# Patient Record
Sex: MALE | Race: WHITE | NOT HISPANIC OR LATINO | Employment: FULL TIME | URBAN - METROPOLITAN AREA
[De-identification: names, ages, dates, MRNs, and addresses within clinical notes are randomized per-mention and may not be internally consistent; named-entity substitution may affect disease eponyms.]

---

## 2020-01-02 ENCOUNTER — APPOINTMENT (OUTPATIENT)
Dept: GENERAL RADIOLOGY | Facility: HOSPITAL | Age: 20
End: 2020-01-02

## 2020-01-02 ENCOUNTER — HOSPITAL ENCOUNTER (EMERGENCY)
Facility: HOSPITAL | Age: 20
Discharge: HOME | End: 2020-01-02
Attending: EMERGENCY MEDICINE | Admitting: EMERGENCY MEDICINE

## 2020-01-02 VITALS
TEMPERATURE: 98.6 F | SYSTOLIC BLOOD PRESSURE: 110 MMHG | DIASTOLIC BLOOD PRESSURE: 83 MMHG | WEIGHT: 185 LBS | HEART RATE: 85 BPM | BODY MASS INDEX: 24.52 KG/M2 | OXYGEN SATURATION: 100 % | RESPIRATION RATE: 18 BRPM | HEIGHT: 73 IN

## 2020-01-02 DIAGNOSIS — S60.417A ABRASION OF LEFT LITTLE FINGER, INITIAL ENCOUNTER: ICD-10-CM

## 2020-01-02 DIAGNOSIS — S60.052A CONTUSION OF LEFT LITTLE FINGER WITHOUT DAMAGE TO NAIL, INITIAL ENCOUNTER: Primary | ICD-10-CM

## 2020-01-02 PROCEDURE — 73140 X-RAY EXAM OF FINGER(S): CPT

## 2020-01-02 PROCEDURE — 99282 EMERGENCY DEPT VISIT SF MDM: CPT

## 2020-01-02 PROCEDURE — 99283 EMERGENCY DEPT VISIT LOW MDM: CPT

## 2020-01-03 NOTE — ED NOTES
Reports trip and fall and landed on L hand. L fifth finger is swollen and painful with small abrassion to first knuckle.      Daina Packer, RN  01/02/20 2103

## 2020-01-03 NOTE — ED PROVIDER NOTES
Pt presents to the ED c/o  left pinky pain that occurred after he tripped and fell and landed on the ground earlier today.  Denies any numbness or weakness to the finger.  Has an abrasion to the finger.     On exam,   General: Awake, alert, no acute distress  HEENT: EOMI  Pulm: Symmetric chest rise, nonlabored breathing  CV: Regular rate and rhythm  GI: Non-distended  MSK: No deformity, mild edema at the PIP joint of the left fifth digit with a superficial abrasion.  Has full range of motion to the digit with some mild tenderness over the PIP joint.  NVI distally  Skin: Warm, dry  Neuro: Alert and oriented x 3, moving all extremities, no focal deficits  Psych: Calm, cooperative    Vital signs and nursing notes reviewed.         Plan:  Discharge home, finger splint for comfort, Tylenol/Ibuprofen for pain as needed, ice for pain and swelling. X-ray shows possible MCP joint dislocation, however, on repeat exam patient has normal flexion/extension of the MCP joint of the left 5th digit. Maybe sprained joint causing laxity, recommended orthopedic follow up if persistent issues.        Attestation:  The MAURO and I have discussed this patient's history, physical exam, and treatment plan.  I have reviewed the documentation and personally had a face to face interaction with the patient. I affirm the documentation and agree with the treatment and plan.  The attached note describes my personal findings.            Mariusz Christopher MD  01/02/20 8679

## 2020-01-03 NOTE — ED PROVIDER NOTES
EMERGENCY DEPARTMENT ENCOUNTER    CHIEF COMPLAINT  Chief Complaint: finger injury  History given by: pt  History limited by: veronica  Time Seen: 2117  Room Number: 33/33  PMD: Edinson Tran MD      HPI:  Pt is a 19 y.o. male who presents with constant pain to his L pinky finger after tripping and falling earlier today.  He denies neck pain and back pain and did not hit his head during the fall.  He has no other complaints.        Duration: PTA  Timing: constant  Location: L pinky finger  Intensity/Severity: mild      PAST MEDICAL HISTORY  Active Ambulatory Problems     Diagnosis Date Noted   • No Active Ambulatory Problems     Resolved Ambulatory Problems     Diagnosis Date Noted   • No Resolved Ambulatory Problems     Past Medical History:   Diagnosis Date   • Acne        PAST SURGICAL HISTORY  History reviewed. No pertinent surgical history.    FAMILY HISTORY  History reviewed. No pertinent family history.    SOCIAL HISTORY  Social History     Socioeconomic History   • Marital status: Single     Spouse name: Not on file   • Number of children: Not on file   • Years of education: Not on file   • Highest education level: Not on file   Tobacco Use   • Smoking status: Never Smoker   Substance and Sexual Activity   • Alcohol use: No   • Drug use: No         ALLERGIES  Patient has no known allergies.    REVIEW OF SYSTEMS  Review of Systems   Constitutional: Negative for chills and fever.   HENT: Negative for sore throat.    Gastrointestinal: Negative for nausea and vomiting.   Genitourinary: Negative for dysuria.   Musculoskeletal: Positive for arthralgias (L pinky finger). Negative for back pain and neck pain.   Skin: Negative for rash.   Neurological: Negative for headaches.   Psychiatric/Behavioral: The patient is not nervous/anxious.        PHYSICAL EXAM  ED Triage Vitals   Temp Heart Rate Resp BP SpO2   01/02/20 2118 01/02/20 2106 01/02/20 2106 01/02/20 2106 01/02/20 2106   98.6 °F (37 °C) 92 18 129/80 100 %          Physical Exam   Constitutional: He is well-developed, well-nourished, and in no distress. No distress.   HENT:   Head: Atraumatic.   Mouth/Throat: Mucous membranes are normal.   Eyes: No scleral icterus.   Neck: Normal range of motion.   Cardiovascular: Normal rate, regular rhythm and normal heart sounds.   2+ radial pulse   Pulmonary/Chest: Effort normal and breath sounds normal.   Musculoskeletal: Normal range of motion. He exhibits tenderness (L little finger).        Cervical back: He exhibits no tenderness.        Left hand: He exhibits tenderness and swelling. He exhibits normal range of motion, normal capillary refill and no deformity. Normal sensation noted. Normal strength noted.        Hands:  Normal ROM.     Neurological: He is alert.   Skin: Skin is warm and dry.   Psychiatric: Mood and affect normal.   Nursing note and vitals reviewed.        RADIOLOGY  XR Finger 2+ View Left   Final Result   Palmar dislocation suspected at the metacarpophalangeal joint of little   finger.       This report was finalized on 1/2/2020 9:59 PM by Dr. Susan Izaguirre M.D.              I ordered the above noted radiological studies and reviewed the images on the PACS system.       PROCEDURES    Splint Application  Time: 2200  Location:left little finger   Splint Type: alumafoam splint   The splinted body part was neurovascularly unchanged and is in good alignment following the procedure.  Patient tolerated the procedure well with no immediate complications.    PROGRESS AND CONSULTS     2118: Reviewed pt's history and workup with Dr. Ashwin MD.  At bedside evaluation, they agree with the plan of care.  2150:   Reviewed implications of results, diagnosis, meds, responsibility to follow up, warning signs and symptoms of possible worsening, potential complications and reasons to return to ER with patient.  Discussed all results and noted any abnormalities with patient.  Discussed absolute need to recheck abnormalities  "with PMD and Orthopedic    Discussed plan for discharge, as there is no emergent indication for admission.  Pt is agreeable and understands need for follow up and repeat testing.  Pt is aware that discharge does not mean that nothing is wrong but it indicates no emergency is present.  Pt is discharged with instructions to follow up with primary care doctor to have their blood pressure rechecked.       DIAGNOSIS  Final diagnoses:   Contusion of left little finger without damage to nail, initial encounter   Abrasion of left little finger, initial encounter       FOLLOW UP   Edinson Tran MD  [address]  [facility] [address]  [phone]    Schedule an appointment as soon as possible for a visit   As needed    Thee Rosales II, MD  [address] [facility]  [address]  [facility] [id number] [phone]    Schedule an appointment as soon as possible for a visit   As needed            COURSE & MEDICAL DECISION MAKING  Pertinent Imaging studies that were ordered and reviewed are noted above.  Results were reviewed/discussed with the patient and they were also made aware of online assess.        MEDICATIONS GIVEN IN ER  Medications - No data to display    /83   Pulse 85   Temp 98.6 °F (37 °C) (Oral)   Resp 18   Ht 185.4 cm (73\")   Wt 83.9 kg (185 lb)   SpO2 100%   BMI 24.41 kg/m²       I personally reviewed the past medical history, past surgical history, social history, family history, current medications and allergies as they appear in this chart.  The scribe's note accurately reflects the work and decisions made by me.     Documentation assistance provided by chan Bradshaw for MARY Nguyen on 1/2/2020 at 9:26 PM. Information recorded by the scribe was done at my direction and has been verified and validated by me.     Macario Bradshaw  01/02/20 2123       Fatemeh Willis APRN  01/02/20 2319    "

## 2020-01-03 NOTE — DISCHARGE INSTRUCTIONS
Ibuprofen as needed for pain  Wear splint as needed for comfort  Pain abrasion daily with soap and water  Return to the ER for any new or worsening symptoms